# Patient Record
Sex: MALE | Race: WHITE | Employment: FULL TIME | URBAN - METROPOLITAN AREA
[De-identification: names, ages, dates, MRNs, and addresses within clinical notes are randomized per-mention and may not be internally consistent; named-entity substitution may affect disease eponyms.]

---

## 2019-07-06 ENCOUNTER — HOSPITAL ENCOUNTER (EMERGENCY)
Age: 29
Discharge: HOME | End: 2019-07-06
Attending: EMERGENCY MEDICINE

## 2019-07-06 VITALS
HEART RATE: 60 BPM | TEMPERATURE: 97.7 F | SYSTOLIC BLOOD PRESSURE: 123 MMHG | HEIGHT: 74 IN | BODY MASS INDEX: 28.23 KG/M2 | WEIGHT: 220 LBS | DIASTOLIC BLOOD PRESSURE: 71 MMHG | RESPIRATION RATE: 14 BRPM | OXYGEN SATURATION: 98 %

## 2019-07-06 DIAGNOSIS — L30.9 DERMATITIS: Primary | ICD-10-CM
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
## 2019-07-06 PROCEDURE — 99282 EMERGENCY DEPT VISIT SF MDM: CPT

## 2019-07-06 RX ORDER — DIPHENHYDRAMINE HCL 25 MG
25 CAPSULE ORAL EVERY 6 HOURS PRN
Qty: 20 CAPSULE | Refills: 0 | Status: SHIPPED | OUTPATIENT
Start: 2019-07-06 | End: 2019-07-11

## 2019-07-06 RX ORDER — CEPHALEXIN 500 MG/1
500 CAPSULE ORAL 4 TIMES DAILY
Qty: 28 CAPSULE | Refills: 0 | Status: SHIPPED | OUTPATIENT
Start: 2019-07-06 | End: 2019-07-13

## 2019-07-06 ASSESSMENT — ENCOUNTER SYMPTOMS
SORE THROAT: 0
PERI-ORBITAL EDEMA: 0
VOMITING: 0
THROAT SWELLING: 0

## 2019-07-06 NOTE — ED NOTES
Pt states onset of s/s's was Thursday when he noticed some \"bites on my back\"  Pt then started having other bites or hives to his bilat arms and legs. Pt c/o itching. Pt arrives A+O x 4, GCS = 15, PMS x 4 intact, eupneic, and PWD. Lung sounds clear t/o bilat. Pt has no visible oral swelling, and he denies any swelling or dyspnea.      Melodie Villaseñor RN  07/06/19 3516

## 2019-07-06 NOTE — ED PROVIDER NOTES
EMERGENCY DEPARTMENT ENCOUNTER    Pt Name: Priya Crystal  MRN: [id number]  [place] 1990  Date of evaluation: 7/6/19  CHIEF COMPLAINT       Chief Complaint   Patient presents with    Rash     HISTORY OF PRESENT ILLNESS     Rash   Location: right hand, left side of back, right forearm, right leg. Quality: itchiness, redness and swelling    Severity:  Moderate  Onset quality:  Gradual  Duration:  2 days  Timing:  Constant  Progression:  Worsening  Chronicity:  New  Context comment:  Bug bites while in field party on 4th of july  Relieved by:  Nothing  Worsened by:  Nothing  Ineffective treatments:  None tried  Associated symptoms: no fatigue, no fever, no periorbital edema, no sore throat, no throat swelling, no tongue swelling and not vomiting        REVIEW OF SYSTEMS     Review of Systems   Constitutional: Negative for fatigue and fever. HENT: Negative for sore throat. Gastrointestinal: Negative for vomiting. Skin: Positive for rash. All other systems reviewed and are negative. PASTMEDICAL HISTORY   History reviewed. No pertinent past medical history. SURGICAL HISTORY       Past Surgical History:   Procedure Laterality Date    ANKLE FRACTURE SURGERY Left     APPENDECTOMY      FRACTURE SURGERY       CURRENT MEDICATIONS       Previous Medications    No medications on file     ALLERGIES     is allergic to cleocin [clindamycin] and penicillins. FAMILY HISTORY     has no family status information on file.       SOCIAL HISTORY       Social History     Tobacco Use    Smoking status: Current Every Day Smoker     Packs/day: 1.00     Years: 10.00     Pack years: 10.00     Types: Cigarettes    Smokeless tobacco: Never Used   Substance Use Topics    Alcohol use: Yes     Comment: social    Drug use: Never     PHYSICAL EXAM     INITIAL VITALS: /71   Pulse 60   Temp 97.7 °F (36.5 °C) (Oral)   Resp 14   Ht 6' 2\" (1.88 m)   Wt 220 lb (99.8 kg)   SpO2 98%   BMI 28.25 kg/m²    Physical Exam Constitutional: He is oriented to person, place, and time. He appears well-developed and well-nourished. No distress. HENT:   Head: Normocephalic and atraumatic. Nose: Nose normal.   Eyes: Pupils are equal, round, and reactive to light. Conjunctivae and EOM are normal. Right eye exhibits no discharge. Left eye exhibits no discharge. Neck: Normal range of motion. Neck supple. No tracheal deviation present. Cardiovascular: Normal rate, regular rhythm, normal heart sounds and intact distal pulses. Pulmonary/Chest: Effort normal and breath sounds normal. No stridor. No respiratory distress. He has no wheezes. He has no rales. He exhibits no tenderness. Abdominal: Soft. Bowel sounds are normal. There is no tenderness. There is no rebound and no guarding. Musculoskeletal: Normal range of motion. He exhibits no edema or tenderness. Neurological: He is alert and oriented to person, place, and time. No cranial nerve deficit. Coordination normal.   Skin: Skin is warm and dry. Capillary refill takes less than 2 seconds. He is not diaphoretic. Small erythematous nodular lesions with central scabbing on right leg, left side back, right hand and fingers, right forearm. The right forearm has 10 cm by 6 cm surrounding erythema. Psychiatric: He has a normal mood and affect.  His behavior is normal. Judgment normal.       MEDICAL DECISION MAKING:   I suspect insect bites with dermatitis, mild right forearm cellulitis  rx benadryl and keflex  dw pt ag [facility] 48 hours  No driving or working while taking benadryl which he understands           Procedures    DIAGNOSTIC RESULTS       EMERGENCY DEPARTMENTCOURSE:         Vitals:    Vitals:    07/06/19 0918   BP: 123/71   Pulse: 60   Resp: 14   Temp: 97.7 °F (36.5 °C)   TempSrc: Oral   SpO2: 98%   Weight: 220 lb (99.8 kg)   Height: 6' 2\" (1.88 m)       The patient was given the following medications while in the emergency department:  Orders Placed

## 2019-09-23 ENCOUNTER — HOSPITAL ENCOUNTER (EMERGENCY)
Age: 29
Discharge: LEFT BEFORE DISCHARGE | End: 2019-09-23

## 2019-09-23 VITALS
HEART RATE: 77 BPM | TEMPERATURE: 99.1 F | BODY MASS INDEX: 29.84 KG/M2 | OXYGEN SATURATION: 96 % | HEIGHT: 75 IN | RESPIRATION RATE: 16 BRPM | WEIGHT: 240 LBS | DIASTOLIC BLOOD PRESSURE: 59 MMHG | SYSTOLIC BLOOD PRESSURE: 130 MMHG